# Patient Record
Sex: MALE | Race: WHITE | Employment: FULL TIME | URBAN - METROPOLITAN AREA
[De-identification: names, ages, dates, MRNs, and addresses within clinical notes are randomized per-mention and may not be internally consistent; named-entity substitution may affect disease eponyms.]

---

## 2017-06-21 ENCOUNTER — OFFICE VISIT (OUTPATIENT)
Dept: OCCUPATIONAL MEDICINE | Age: 37
End: 2017-06-21
Attending: PHYSICIAN ASSISTANT

## 2018-11-16 ENCOUNTER — OFFICE VISIT (OUTPATIENT)
Dept: INTERNAL MEDICINE CLINIC | Facility: CLINIC | Age: 38
End: 2018-11-16
Payer: COMMERCIAL

## 2018-11-16 ENCOUNTER — LAB ENCOUNTER (OUTPATIENT)
Dept: LAB | Age: 38
End: 2018-11-16
Attending: INTERNAL MEDICINE
Payer: COMMERCIAL

## 2018-11-16 VITALS
BODY MASS INDEX: 26.36 KG/M2 | TEMPERATURE: 99 F | WEIGHT: 178 LBS | DIASTOLIC BLOOD PRESSURE: 58 MMHG | SYSTOLIC BLOOD PRESSURE: 110 MMHG | HEART RATE: 54 BPM | HEIGHT: 68.75 IN

## 2018-11-16 DIAGNOSIS — Z86.718 HISTORY OF DVT (DEEP VEIN THROMBOSIS): ICD-10-CM

## 2018-11-16 DIAGNOSIS — Z00.00 PREVENTATIVE HEALTH CARE: Primary | ICD-10-CM

## 2018-11-16 DIAGNOSIS — Z00.00 PREVENTATIVE HEALTH CARE: ICD-10-CM

## 2018-11-16 PROCEDURE — 80050 GENERAL HEALTH PANEL: CPT | Performed by: INTERNAL MEDICINE

## 2018-11-16 PROCEDURE — 83036 HEMOGLOBIN GLYCOSYLATED A1C: CPT | Performed by: INTERNAL MEDICINE

## 2018-11-16 PROCEDURE — 99385 PREV VISIT NEW AGE 18-39: CPT | Performed by: INTERNAL MEDICINE

## 2018-11-16 PROCEDURE — 36415 COLL VENOUS BLD VENIPUNCTURE: CPT | Performed by: INTERNAL MEDICINE

## 2018-11-16 PROCEDURE — 80061 LIPID PANEL: CPT | Performed by: INTERNAL MEDICINE

## 2018-11-16 NOTE — PROGRESS NOTES
Aviva Velasco is a 43year old male. HPI:   Patient presents with:  CPX  Patient presents for CPX/wellness examination. Diet: Does not get a lot of vegetables. Does take some supplements, fish oil. A lot of lean meats, lower on the carbs.     Exercise: Patient Position: Sitting, Cuff Size: adult)   Pulse 54   Temp 98.7 °F (37.1 °C) (Oral)   Ht 68.75\"   Wt 178 lb   BMI 26.48 kg/m²   GENERAL: Alert and oriented, well developed, well nourished,in no apparent distress  SKIN: no rashes,no suspicious lesions

## 2018-11-16 NOTE — PATIENT INSTRUCTIONS
- Get blood work done today. We will keep in mind that you are not fasting when we interpret the results. - We will fax over your work screening form once we get your lab results. It was a pleasure seeing you in the clinic today.   Thank you for [facility]

## 2018-11-18 PROBLEM — Z86.718 HISTORY OF DVT (DEEP VEIN THROMBOSIS): Status: ACTIVE | Noted: 2018-11-18

## 2019-07-05 ENCOUNTER — TELEPHONE (OUTPATIENT)
Dept: INTERNAL MEDICINE CLINIC | Facility: CLINIC | Age: 39
End: 2019-07-05

## 2019-07-05 DIAGNOSIS — M25.511 ACUTE PAIN OF RIGHT SHOULDER: Primary | ICD-10-CM

## 2019-07-05 NOTE — TELEPHONE ENCOUNTER
Patient calling in stated he is in extreme pain from his shoulder cuff and arm, and would like to speak with a Nurse today.

## 2019-07-05 NOTE — TELEPHONE ENCOUNTER
Called pt back to get more information. Pt stated \"not extreme\" pain, however, pain has increased on R shoulder. Pt stated not able to lift arm above head/ sport injury related since February. Pt stated to be a .  After a few weeks af